# Patient Record
Sex: FEMALE | Race: WHITE | NOT HISPANIC OR LATINO | URBAN - METROPOLITAN AREA
[De-identification: names, ages, dates, MRNs, and addresses within clinical notes are randomized per-mention and may not be internally consistent; named-entity substitution may affect disease eponyms.]

---

## 2020-06-04 ENCOUNTER — OUTPATIENT (OUTPATIENT)
Dept: OUTPATIENT SERVICES | Facility: HOSPITAL | Age: 60
LOS: 1 days | End: 2020-06-04
Payer: COMMERCIAL

## 2020-06-04 VITALS — WEIGHT: 145.06 LBS | HEIGHT: 62 IN

## 2020-06-04 DIAGNOSIS — Z98.890 OTHER SPECIFIED POSTPROCEDURAL STATES: Chronic | ICD-10-CM

## 2020-06-04 DIAGNOSIS — Z01.818 ENCOUNTER FOR OTHER PREPROCEDURAL EXAMINATION: ICD-10-CM

## 2020-06-04 DIAGNOSIS — N83.209 UNSPECIFIED OVARIAN CYST, UNSPECIFIED SIDE: ICD-10-CM

## 2020-06-04 PROCEDURE — G0463: CPT

## 2020-06-04 RX ORDER — ATORVASTATIN CALCIUM 80 MG/1
0 TABLET, FILM COATED ORAL
Qty: 30 | Refills: 0 | DISCHARGE

## 2020-06-04 NOTE — H&P PST ADULT - HISTORY OF PRESENT ILLNESS
58 yo female with H/O anxiety, scheduled for laparoscopic bilateral salpingo oophorectomy on 6/10 with Dr. La. Reports that she has a left ovarian mass. Denies pain, discomfort and abnormal bleeding.   LMP 2018 TOP x1 Missed ab x 1. Denies recent international travel, recent illness and sick contact with COVID in the last 3 weeks     Due to COVID-19 protocol the history is being done via telephone interview and physical exam will be done on admit.

## 2020-06-04 NOTE — H&P PST ADULT - NSICDXPASTMEDICALHX_GEN_ALL_CORE_FT
PAST MEDICAL HISTORY:  Anxiety     HLD (hyperlipidemia)     Unspecified ovarian cyst, unspecified side

## 2020-06-04 NOTE — H&P PST ADULT - NSICDXPROBLEM_GEN_ALL_CORE_FT
PROBLEM DIAGNOSES  Problem: Unspecified ovarian cyst, unspecified side  Assessment and Plan: scheduled for laparoscopic bilateral salpingo oophorectomy on 6/10 with Dr. La.    Problem: Pre-op evaluation  Assessment and Plan: MC with Dr. Michael. Labs done in the [facility]. To be fwd to PST  To be swabbed for COVID and given Hibiclens instructions @ [place]. Appt TBD  Pre op instructions reviewed. Take routine am Paxil DOS with sip of water. Instructed to avoid NSAIDs and OTC supplements. Verbalized understanding

## 2020-06-04 NOTE — H&P PST ADULT - NSICDXPASTSURGICALHX_GEN_ALL_CORE_FT
PAST SURGICAL HISTORY:  H/O arthroscopy of knee Right knee    H/O bilateral breast reduction surgery     H/O sinus surgery     History of eyelid surgery     One previous induced termination of pregnancy

## 2020-06-08 PROBLEM — N83.209 UNSPECIFIED OVARIAN CYST, UNSPECIFIED SIDE: Chronic | Status: ACTIVE | Noted: 2020-06-04

## 2020-06-08 PROBLEM — F41.9 ANXIETY DISORDER, UNSPECIFIED: Chronic | Status: ACTIVE | Noted: 2020-06-04

## 2020-06-08 PROBLEM — E78.5 HYPERLIPIDEMIA, UNSPECIFIED: Chronic | Status: ACTIVE | Noted: 2020-06-04

## 2020-06-09 ENCOUNTER — OUTPATIENT (OUTPATIENT)
Dept: OUTPATIENT SERVICES | Facility: HOSPITAL | Age: 60
LOS: 1 days | End: 2020-06-09
Payer: COMMERCIAL

## 2020-06-09 DIAGNOSIS — Z98.890 OTHER SPECIFIED POSTPROCEDURAL STATES: Chronic | ICD-10-CM

## 2020-06-09 DIAGNOSIS — N83.209 UNSPECIFIED OVARIAN CYST, UNSPECIFIED SIDE: ICD-10-CM

## 2020-06-09 DIAGNOSIS — Z11.59 ENCOUNTER FOR SCREENING FOR OTHER VIRAL DISEASES: ICD-10-CM

## 2020-06-09 LAB — SARS-COV-2 RNA SPEC QL NAA+PROBE: SIGNIFICANT CHANGE UP

## 2020-06-09 PROCEDURE — 87635 SARS-COV-2 COVID-19 AMP PRB: CPT

## 2020-06-09 NOTE — ASU PATIENT PROFILE, ADULT - PSH
H/O arthroscopy of knee  Right knee  H/O bilateral breast reduction surgery    H/O sinus surgery    History of eyelid surgery    One previous induced termination of pregnancy

## 2020-06-10 ENCOUNTER — OUTPATIENT (OUTPATIENT)
Dept: OUTPATIENT SERVICES | Facility: HOSPITAL | Age: 60
LOS: 1 days | End: 2020-06-10
Payer: COMMERCIAL

## 2020-06-10 VITALS
HEIGHT: 62 IN | HEART RATE: 96 BPM | RESPIRATION RATE: 17 BRPM | DIASTOLIC BLOOD PRESSURE: 77 MMHG | OXYGEN SATURATION: 98 % | WEIGHT: 145.06 LBS | TEMPERATURE: 98 F | SYSTOLIC BLOOD PRESSURE: 130 MMHG

## 2020-06-10 VITALS
SYSTOLIC BLOOD PRESSURE: 128 MMHG | RESPIRATION RATE: 17 BRPM | DIASTOLIC BLOOD PRESSURE: 60 MMHG | HEART RATE: 100 BPM | OXYGEN SATURATION: 96 %

## 2020-06-10 DIAGNOSIS — Z98.890 OTHER SPECIFIED POSTPROCEDURAL STATES: Chronic | ICD-10-CM

## 2020-06-10 DIAGNOSIS — N83.209 UNSPECIFIED OVARIAN CYST, UNSPECIFIED SIDE: ICD-10-CM

## 2020-06-10 LAB — HCG UR QL: NEGATIVE — SIGNIFICANT CHANGE UP

## 2020-06-10 PROCEDURE — 88108 CYTOPATH CONCENTRATE TECH: CPT

## 2020-06-10 PROCEDURE — 88341 IMHCHEM/IMCYTCHM EA ADD ANTB: CPT

## 2020-06-10 PROCEDURE — 86850 RBC ANTIBODY SCREEN: CPT

## 2020-06-10 PROCEDURE — 86901 BLOOD TYPING SEROLOGIC RH(D): CPT

## 2020-06-10 PROCEDURE — 88305 TISSUE EXAM BY PATHOLOGIST: CPT

## 2020-06-10 PROCEDURE — 88342 IMHCHEM/IMCYTCHM 1ST ANTB: CPT

## 2020-06-10 PROCEDURE — 58661 LAPAROSCOPY REMOVE ADNEXA: CPT

## 2020-06-10 PROCEDURE — 36415 COLL VENOUS BLD VENIPUNCTURE: CPT

## 2020-06-10 PROCEDURE — 88305 TISSUE EXAM BY PATHOLOGIST: CPT | Mod: 26

## 2020-06-10 PROCEDURE — 88341 IMHCHEM/IMCYTCHM EA ADD ANTB: CPT | Mod: 26

## 2020-06-10 PROCEDURE — 88108 CYTOPATH CONCENTRATE TECH: CPT | Mod: 26

## 2020-06-10 PROCEDURE — 88342 IMHCHEM/IMCYTCHM 1ST ANTB: CPT | Mod: 26

## 2020-06-10 PROCEDURE — 58662 LAPAROSCOPY EXCISE LESIONS: CPT

## 2020-06-10 PROCEDURE — 86900 BLOOD TYPING SEROLOGIC ABO: CPT

## 2020-06-10 PROCEDURE — 81025 URINE PREGNANCY TEST: CPT

## 2020-06-10 RX ORDER — SODIUM CHLORIDE 9 MG/ML
1000 INJECTION, SOLUTION INTRAVENOUS
Refills: 0 | Status: DISCONTINUED | OUTPATIENT
Start: 2020-06-10 | End: 2020-06-10

## 2020-06-10 RX ORDER — OXYCODONE HYDROCHLORIDE 5 MG/1
5 TABLET ORAL ONCE
Refills: 0 | Status: DISCONTINUED | OUTPATIENT
Start: 2020-06-10 | End: 2020-06-10

## 2020-06-10 RX ORDER — ACETAMINOPHEN 500 MG
975 TABLET ORAL ONCE
Refills: 0 | Status: COMPLETED | OUTPATIENT
Start: 2020-06-10 | End: 2020-06-10

## 2020-06-10 RX ORDER — ONDANSETRON 8 MG/1
4 TABLET, FILM COATED ORAL ONCE
Refills: 0 | Status: DISCONTINUED | OUTPATIENT
Start: 2020-06-10 | End: 2020-06-10

## 2020-06-10 RX ORDER — HYDROMORPHONE HYDROCHLORIDE 2 MG/ML
0.5 INJECTION INTRAMUSCULAR; INTRAVENOUS; SUBCUTANEOUS ONCE
Refills: 0 | Status: DISCONTINUED | OUTPATIENT
Start: 2020-06-10 | End: 2020-06-10

## 2020-06-10 RX ADMIN — SODIUM CHLORIDE 35 MILLILITER(S): 9 INJECTION, SOLUTION INTRAVENOUS at 06:42

## 2020-06-10 RX ADMIN — Medication 975 MILLIGRAM(S): at 06:41

## 2020-06-10 RX ADMIN — SODIUM CHLORIDE 75 MILLILITER(S): 9 INJECTION, SOLUTION INTRAVENOUS at 09:23

## 2020-06-10 NOTE — ASU DISCHARGE PLAN (ADULT/PEDIATRIC) - PAIN MANAGEMENT
Please take Motrin over the counter 200 mg pills , 3 tablet 3 times a day with meals for 3 days regardless of pain and as needed therafter. A prescription for percocet has been provided if additional pain control needed./Prescription given to patient/guardian

## 2020-06-10 NOTE — ASU DISCHARGE PLAN (ADULT/PEDIATRIC) - ASU DC SPECIAL INSTRUCTIONSFT
Please take Motrin over the counter 200 mg pills , 3 tablet 3 times a day with meals for 3 days regardless of pain and as needed thereafter. A prescription for percocet has been provided if additional pain control needed.  please make an appointment to be seen in 2 wks  ****Call the office with any problems including but not limited to heavy vaginal bleeding, fevers, severe abdominal pain, inability to eat/drink/urinate  **** Nothing in the vagina x2 weeks-( No sex, tampons, douching )  *****You may shower as usual but no hot tubs, bath tubs, swimming pools x2 weeks.  for urgent post op care please contact Natalie at Dr. La's surgical hotline  [phone]

## 2020-06-10 NOTE — ASU DISCHARGE PLAN (ADULT/PEDIATRIC) - CALL YOUR DOCTOR IF YOU HAVE ANY OF THE FOLLOWING:
Bleeding that does not stop/Inability to tolerate liquids or foods/Nausea and vomiting that does not stop/Unable to urinate

## 2020-06-10 NOTE — ASU DISCHARGE PLAN (ADULT/PEDIATRIC) - CARE PROVIDER_API CALL
Kamran La  [facility]ST[facility]ICS [facility] G[facility]  [address] [place] [address]  Phone: [phone]  Fax: [phone]  Follow Up Time:

## 2020-06-11 LAB
NON-GYNECOLOGICAL CYTOLOGY STUDY: SIGNIFICANT CHANGE UP
SURGICAL PATHOLOGY STUDY: SIGNIFICANT CHANGE UP

## 2024-01-10 NOTE — ASU PATIENT PROFILE, ADULT - TEACHING/LEARNING FACTORS INFLUENCE READINESS TO LEARN
Detail Level: Zone
Initiate Treatment: Triamcinolone 0.1% topical cream, apply to back and stomach itchy areas
Initiate Treatment: Triamcinolone 0.1% topical cream apply to trunk and back PRN with itching
none